# Patient Record
Sex: FEMALE | Race: WHITE | URBAN - METROPOLITAN AREA
[De-identification: names, ages, dates, MRNs, and addresses within clinical notes are randomized per-mention and may not be internally consistent; named-entity substitution may affect disease eponyms.]

---

## 2020-05-18 ENCOUNTER — IMPORTED ENCOUNTER (OUTPATIENT)
Dept: URBAN - METROPOLITAN AREA CLINIC 50 | Facility: CLINIC | Age: 70
End: 2020-05-18

## 2020-05-20 ENCOUNTER — IMPORTED ENCOUNTER (OUTPATIENT)
Dept: URBAN - METROPOLITAN AREA CLINIC 50 | Facility: CLINIC | Age: 70
End: 2020-05-20

## 2020-05-21 ENCOUNTER — IMPORTED ENCOUNTER (OUTPATIENT)
Dept: URBAN - METROPOLITAN AREA CLINIC 50 | Facility: CLINIC | Age: 70
End: 2020-05-21

## 2020-06-03 ENCOUNTER — IMPORTED ENCOUNTER (OUTPATIENT)
Dept: URBAN - METROPOLITAN AREA CLINIC 50 | Facility: CLINIC | Age: 70
End: 2020-06-03

## 2020-07-01 ENCOUNTER — IMPORTED ENCOUNTER (OUTPATIENT)
Dept: URBAN - METROPOLITAN AREA CLINIC 50 | Facility: CLINIC | Age: 70
End: 2020-07-01

## 2020-07-02 ENCOUNTER — IMPORTED ENCOUNTER (OUTPATIENT)
Dept: URBAN - METROPOLITAN AREA CLINIC 50 | Facility: CLINIC | Age: 70
End: 2020-07-02

## 2020-07-07 ENCOUNTER — IMPORTED ENCOUNTER (OUTPATIENT)
Dept: URBAN - METROPOLITAN AREA CLINIC 50 | Facility: CLINIC | Age: 70
End: 2020-07-07

## 2020-07-07 NOTE — PATIENT DISCUSSION
"""S/P IOL OD: Tecnis ZCB00 +23.0 (Target: [place]) +Femto/Arcs +Omidria. Continue post operative instructions and drops per schedule.  """

## 2020-07-15 ENCOUNTER — IMPORTED ENCOUNTER (OUTPATIENT)
Dept: URBAN - METROPOLITAN AREA CLINIC 50 | Facility: CLINIC | Age: 70
End: 2020-07-15

## 2020-07-21 ENCOUNTER — IMPORTED ENCOUNTER (OUTPATIENT)
Dept: URBAN - METROPOLITAN AREA CLINIC 50 | Facility: CLINIC | Age: 70
End: 2020-07-21

## 2020-07-27 ENCOUNTER — IMPORTED ENCOUNTER (OUTPATIENT)
Dept: URBAN - METROPOLITAN AREA CLINIC 50 | Facility: CLINIC | Age: 70
End: 2020-07-27

## 2020-07-30 ENCOUNTER — IMPORTED ENCOUNTER (OUTPATIENT)
Dept: URBAN - METROPOLITAN AREA CLINIC 50 | Facility: CLINIC | Age: 70
End: 2020-07-30

## 2020-07-30 NOTE — PATIENT DISCUSSION
"""S/P IOL OS: Tecnis ZCB00 +23.0 (Target: [place]) +Femto/Arcs +Omidria. Continue post operative instructions and drops per schedule.  """

## 2020-08-26 ENCOUNTER — IMPORTED ENCOUNTER (OUTPATIENT)
Dept: URBAN - METROPOLITAN AREA CLINIC 50 | Facility: CLINIC | Age: 70
End: 2020-08-26

## 2020-12-30 ENCOUNTER — IMPORTED ENCOUNTER (OUTPATIENT)
Dept: URBAN - METROPOLITAN AREA CLINIC 50 | Facility: CLINIC | Age: 70
End: 2020-12-30

## 2021-04-17 ASSESSMENT — VISUAL ACUITY
OS_OTHER: 20/30. 20/50.
OS_CC: J1@ 15 IN
OD_BAT: 20/30
OS_SC: 20/20
OS_PH: 20/40
OS_SC: 20/200
OD_CC: J1@ 15 IN
OS_OTHER: 20/30. 20/30.
OD_OTHER: 20/30. 20/60.
OD_OTHER: 20/30. 20/60.
OD_CC: 20/25
OS_OTHER: 20/25. 20/30.
OD_BAT: 20/30
OS_CC: 20/200
OS_PH: @ 15 IN
OD_BAT: 20/50
OS_BAT: 20/30
OD_CC: J3@ 14 IN
OD_BAT: 20/30
OS_OTHER: 20/30. 20/30.
OD_OTHER: 20/30. 20/60.
OD_CC: 20/30
OS_CC: J3@ 14 IN
OD_SC: 20/20
OD_SC: 20/25
OD_PH: 20/25
OS_BAT: 20/30
OS_SC: 20/25+2
OD_SC: 20/20
OS_CC: 20/25
OS_SC: 20/25+
OS_CC: 20/60
OD_OTHER: 20/20. 20/25.
OD_BAT: 20/20
OD_SC: 20/80
OD_PH: @ 15 IN
OS_BAT: 20/25
OD_SC: 20/20-1
OS_OTHER: 20/40. 20/400.
OS_BAT: 20/40
OS_PH: 20/30
OD_OTHER: 20/50. 20/400.
OD_CC: J3
OS_BAT: 20/30
OS_CC: J3

## 2021-04-17 ASSESSMENT — TONOMETRY
OS_IOP_MMHG: 15
OD_IOP_MMHG: 28
OS_IOP_MMHG: 14
OS_IOP_MMHG: 26
OS_IOP_MMHG: 15
OD_IOP_MMHG: 13
OS_IOP_MMHG: 12
OD_IOP_MMHG: 13
OD_IOP_MMHG: 12
OS_IOP_MMHG: 14
OD_IOP_MMHG: 15
OD_IOP_MMHG: 14
OD_IOP_MMHG: 14
OS_IOP_MMHG: 13